# Patient Record
Sex: MALE | Race: WHITE
[De-identification: names, ages, dates, MRNs, and addresses within clinical notes are randomized per-mention and may not be internally consistent; named-entity substitution may affect disease eponyms.]

---

## 2020-05-13 ENCOUNTER — HOSPITAL ENCOUNTER (OUTPATIENT)
Dept: HOSPITAL 62 - SP | Age: 60
End: 2020-05-13
Attending: FAMILY MEDICINE
Payer: COMMERCIAL

## 2020-05-13 DIAGNOSIS — R20.2: Primary | ICD-10-CM

## 2020-05-13 PROCEDURE — 93925 LOWER EXTREMITY STUDY: CPT

## 2020-05-13 NOTE — RADIOLOGY REPORT (SQ)
EXAM DESCRIPTION:  ARTERIAL LOWER EXTREM BILAT



IMAGES COMPLETED DATE/TIME:  5/13/2020 11:49 am



REASON FOR STUDY:  PARETHESIA OF SKIN R20.2  PARESTHESIA OF SKIN



COMPARISON:  None.



TECHNIQUE:  Dynamic and static gray scale and color images acquired of the lower extremity arteries. 
 Additional selected spectral images recorded.



LIMITATIONS:  None.



FINDINGS:  RIGHT LEG:

INFLOW ARTERIES: Normal, no obstruction evident.

FEMORAL ARTERIES:Multiphasic waveforms. Normal, no velocity elevation to suggest focal stenosis. Norm
al color Doppler evaluation.  No aneurysm.

POPLITEAL ARTERY:Multiphasic waveforms. Normal, no velocity elevation to suggest focal stenosis. Norm
al color Doppler evaluation.  No aneurysm.

PATENT TIBIOPERONEAL TRUNK AND 3 VESSEL RUNOFF: Unable to visualize the peroneal artery.  The anterio
r are not posterior tibial arteries are patent with multiphasic waveforms.

OTHER: No other finding.

LEFT LEG:

INFLOW ARTERIES: Normal, no obstruction evident.

FEMORAL ARTERIES:Multiphasic waveforms. Normal, no velocity elevation to suggest focal stenosis. Norm
al color Doppler evaluation.  No aneurysm.

POPLITEAL ARTERY:Multiphasic waveforms. Normal, no velocity elevation to suggest focal stenosis. Norm
al color Doppler evaluation.  No aneurysm.

PATENT TIBIOPERONEAL TRUNK AND 3 VESSEL RUNOFF: Unable to visualize the peroneal artery.  The anterio
r are not posterior tibial arteries are patent with multiphasic waveforms

OTHER: No other finding.



IMPRESSION:  NO HEMODYNAMICALLY SIGNIFICANT STENOSIS OF THE ARTERIAL VASCULATURE OF THE LOWER EXTREMI
TIES.



TECHNICAL DOCUMENTATION:  JOB ID:  [id number]

 2011 [employer]- All Rights Reserved



Reading location - IP/workstation name: [email or web address]